# Patient Record
(demographics unavailable — no encounter records)

---

## 2024-11-01 NOTE — PHYSICAL EXAM
[Alert] : alert [Normal Voice/Communication] : normal voice/communication [Healthy Appearing] : healthy appearing [No Acute Distress] : no acute distress [Sclera] : the sclera and conjunctiva were normal [Hearing Threshold Finger Rub Not [place]] : hearing was normal [Normal Appearance] : the appearance of the neck was normal [No Respiratory Distress] : no respiratory distress [Respiration, Rhythm And Depth] : normal respiratory rhythm and effort [Auscultation Breath Sounds / Voice Sounds] : lungs were clear to auscultation bilaterally [Heart Rate And Rhythm] : heart rate was normal and rhythm regular [Normal S1, S2] : normal S1 and S2 [Bowel Sounds] : normal bowel sounds [Abdomen Tenderness] : non-tender [No Masses] : no abdominal mass palpated [Abdomen Soft] : soft [Oriented To Time, Place, And Person] : oriented to person, place, and time

## 2024-11-01 NOTE — PHYSICAL EXAM
[Alert] : alert [Normal Voice/Communication] : normal voice/communication [Healthy Appearing] : healthy appearing [No Acute Distress] : no acute distress [Sclera] : the sclera and conjunctiva were normal [Hearing Threshold Finger Rub Not [place]] : hearing was normal [Normal Appearance] : the appearance of the neck was normal [No Respiratory Distress] : no respiratory distress [Respiration, Rhythm And Depth] : normal respiratory rhythm and effort [Auscultation Breath Sounds / Voice Sounds] : lungs were clear to auscultation bilaterally [Heart Rate And Rhythm] : heart rate was normal and rhythm regular [Normal S1, S2] : normal S1 and S2 [Bowel Sounds] : normal bowel sounds [Abdomen Tenderness] : non-tender [No Masses] : no abdominal mass palpated [Abdomen Soft] : soft [Oriented To Time, Place, And Person] : oriented to person, place, and time

## 2024-11-04 NOTE — ASSESSMENT
[FreeTextEntry1] : #Diverticulosis -denies any constipation or BRBPR  -encouraged high fiber diet    #GERD -patient notices symptoms few hours after eating and when lying flat  -denies any dysphagia or odynophagia or other alarm symptoms  -will prescribe pantoprazole 40 QD    #H.pylori: s/p quadruple therapy, no eradication result  obtained yet, will reorder h. pylori stool AG   #screening for colon cancer: uptodate, repeat in 5 years  RTC in 3 months

## 2024-11-04 NOTE — HISTORY OF PRESENT ILLNESS
[FreeTextEntry1] : 51 females with PMH of HTN and extensive surgical history (4 C sections, hernia repair, cholecystectomy, appendectomy) here for follow up on colonoscopy results for CRC screening. Colonoscopy 10/24 revealed left sided moderate diverticulosis, but no polyps were noted. Patient does endorse reflux with eating and when lying flat. Takes pepcip prn for symptoms. Denies dysphagia, odynophagia, weight loss, hematemesis, melena, hematochezia, nausea, vomiting, or changes in bowel habits

## 2024-11-18 NOTE — HISTORY OF PRESENT ILLNESS
[FreeTextEntry1] : fu [de-identified] : 52 yo female w/ PMHx of HTN, asthma, obesity, TAMERA, and migraine presenting for follow up. Patient reports that her asthma is well controlled and uses inhaler 0-1 time per week. She also has 3-4 episodes of migraines a month and is on Rizatriptan PRN which the patient says helps and wishes to stay on.  Patient also reports she was out of losartan and needed a refill. she says that previously her BP at home was well controlled but did not bring her log with her.

## 2024-11-18 NOTE — PHYSICAL EXAM
[No Acute Distress] : no acute distress [Well Nourished] : well nourished [No JVD] : no jugular venous distention [No Respiratory Distress] : no respiratory distress  [No Accessory Muscle Use] : no accessory muscle use [Clear to Auscultation] : lungs were clear to auscultation bilaterally [Normal Rate] : normal rate  [Regular Rhythm] : with a regular rhythm [Normal S1, S2] : normal S1 and S2 [No Murmur] : no murmur heard [No Abdominal Bruit] : a ~M bruit was not heard ~T in the abdomen [No Edema] : there was no peripheral edema [No Extremity Clubbing/Cyanosis] : no extremity clubbing/cyanosis [Soft] : abdomen soft [Non Tender] : non-tender [Non-distended] : non-distended [No Masses] : no abdominal mass palpated [No HSM] : no HSM [No Focal Deficits] : no focal deficits [de-identified] : obesity [de-identified] : morbid obesity

## 2024-11-18 NOTE — ASSESSMENT
[FreeTextEntry1] : #Migraine controlled -On Rizatriptan PRN -Patient reports having 3-4 episodes a month lasting more than 24h controlled with treatment -No red change in frequency or quality or focal neurological symptoms -c/w triptan PRN  #HTN -c/w Losartan 50 mg OD DASH diet discussed and recommended Exercise and weight loss counseled Frequency and target at home BP readings discussed Decrease caffeine intake Treatment options and possible side effects discussed Patient counseled on symptoms of hypo/hypertension Counseled: Yearly Ophthalmology exams bp logs x 2 weeks  #Obesity - gaining weight  - worsening HTN, and new DM - counselled to lose weight to help with her BP and DM Obesity; Diet/Exercise Counseled Patient was counseled on changing their diet to low fat, low carbohydrates and low cholesterol. Patient was also counseled on increasing their activity to 45 minutes of exercise daily.  #new DM - a1c 6.5 this visit - mostly due to weight gain  Diabetes; Low sugar, low carbohydrate diet  Exercise Counseled  Patient given opportunity to discuss frequency and target blood sugar levels Patient educated on symptoms of hypo/hyperglycemic events  Counseled on: Yearly Ophthalmology and Podiatry Exam -   #Iron deficiency anemia -will order CBC and iron studies - repeat colonoscopy in october (fair prep) Colon Cancer Screening; Patient counseled on the importance of colonoscopy screening and directed to follow-up with GI doctor. Failure to perform test can result in Colon Cancer and Death.  #Asthma=-  - mild intermittent asthma well controlled with albuterol only. Using less than ounce a week stable  #Health maintenance Last mamogram in December 2023. Bi RADS 4. Biopsy was done in Jan 2024 and was benign PASH, routine screening needs to resume next Jan 2025 Mammogram Cancer Screening; Patient counseled on the importance of a yearly mammogram screening and directed to have test performed or follow-up with OB/GYN. Failure to perform test can result in breast cancer and death  Pap test 06/06/23: negative for malignancy; atrophic vaginitis HPV 06/06/23: not detected colonoscopy october 2024: Moderate diverticulosis of the the left side of the colon. External hemorrhoids. Normal mucosa in the whole colon Normal mucosa in the terminal ileum. Plan - Recommend high fiber diet - Counseled about the importance of hydration and bowel regimen if needed to avoid constipation in the setting of hemorrhoids and diverticulosis - Recommend outpatient follow up to check for Stool Helicobacter Pylori antigen test to confirm eradication of Helicobacter Pylori infection - In the setting of history of polyps, recommend repeat colonoscopy in 5 years  -F/U after 6 months -CBC, CMP, Lipid panel, A1c, Ferrtin, iron panel, hep B, Hep C, TSH, A/Creatin ratio

## 2024-11-18 NOTE — END OF VISIT
[] : Resident [FreeTextEntry3] : I saw the patient, examined the patient independently, reviewed medical record, and provided the medical services. Agree with resident note as personally edited above. new onset dm2 education as above rendered f/u after 3 mo of intensive lifestyle modifications c repeat a1c

## 2024-11-18 NOTE — REVIEW OF SYSTEMS
[Fever] : no fever [Chills] : no chills [Fatigue] : no fatigue [Chest Pain] : no chest pain [Palpitations] : no palpitations [Lower Ext Edema] : no lower extremity edema [Orthopnea] : no orthopnea [Shortness Of Breath] : no shortness of breath [Wheezing] : no wheezing [Cough] : no cough [Abdominal Pain] : no abdominal pain [Nausea] : no nausea [Constipation] : no constipation [Diarrhea] : diarrhea [Vomiting] : no vomiting [Dysuria] : no dysuria [Incontinence] : no incontinence [Hematuria] : no hematuria [Frequency] : no frequency [Dizziness] : no dizziness [de-identified] : headaches 3-4 times a month

## 2025-05-02 NOTE — ASSESSMENT
[FreeTextEntry1] : Ms. ELEANOR PATEL is a 51 year  old woman. She presented to the office for the first time on 05/02/2025 , her primary is DEEJAY GUALLPA .  [id number] 4 c section  2010 - hernia sx - lap  - doesnt remeber mesh  2012- appendix - dr beltran 2017 - gb - dr chand 2019: open hernia surgery - with ipm ventralight st - dr mcintosh recurrent epigastric hernia      impression recurret hernia morbid obsesty

## 2025-05-02 NOTE — HISTORY OF PRESENT ILLNESS
[de-identified] : Ms. ELEANOR PATEL is a 51 year  old woman. She presented to the office for the first time on 05/02/2025 , her primary is DEEJAY GUALLPA .  [id number] 4 c section  2010 - hernia sx - lap  - doesnt remeber mesh  2012- appendix - dr beltran 2017 - gb - dr chand 2019: open hernia surgery - with ipm ventralight st - dr mcintosh recurrent epigastric hernia

## 2025-06-27 NOTE — ASSESSMENT
[FreeTextEntry1] : Ms. ELEANOR PATEL is a 51 year  old woman. She presented to the office for the first time on 05/02/2025 , her primary is DEEJAY GUALLPA .  [id number] 4 c section  2010 - hernia sx - lap  - doesnt remeber mesh  2012- appendix - dr beltran 2017 - gb - dr chand 2019: open hernia surgery - with ipom ventralight st - dr mcintosh recurrent epigastric hernia  6/27 : ct dpme - small epigastric pain  is doign s alot of exercise and dieting    impression recurret hernia morbid obsesty  - bi 36.3  will need optimizatin  will get ct - fat contianing hernia will order a1c 6.5 will refer to surigical weightloss  We explained in great detail the pathophysiology of the disease process. We discussed the workup for diagnosis and management. The Post operative care was explained to the patient. She was counselled on diet , exercise and wound care. The Procedure was explained to the patient. The Risk , benefit and alternatives were discussed. We discussed recovery and possible complications. We discussed recurrence rate and complications including chronic abdominal pain. We also discussed hernia, surgery and  pain in relation to her  Job.   We explained in great detail the pathophysiology of the disease process. We discussed the workup for diagnosis and management. The Post operative care was explained to the patient. She was counselled on diet , exercise and wound care. The Procedure was explained to the patient. The Risk , benefit and alternatives were discussed. We discussed recovery and possible complications. We discussed recurrence rate and complications including chronic abdominal pain. We also discussed hernia, surgery and  pain in relation to her  Job. We discussed the intricacies of mesh insertion for hernia.  We discussed between kind of mesh synthetic vs biological, absorbability vs non absorbable meshes.We discussed about the position of mesh. We discussed about the fixation of the mesh. We discussed the complication of the mesh including erosions, infections, adhesions, recurrence, breaking , movement and chronic pain.  Counselling: The issue of increased BMI and weight was explained to the patient. We discussed how reducing weight before surgery can improve the outcomes of surgery. The surgery is more precise, less blood loss, complication and duration. The recovery is also easier, with reduced wound complications including infection.  She was counselled on diet and exercise.  We discussed the pathology and surgery with her.   We discussed for over 45  min, including her present medical conditions, medications and if they need to be changed, the medications she is on and various surgical and non-surgical options. This time excludes any separably reportable services (and /or teaching). The Risk, benefit and alternatives were discussed. We discussed recovery and possible complications. her radiological images and labs were independently reviewed in the PACS by me. The Images were reviewed with her.   We discussed the importance of close follow up. We informed that she needs to follow up in 2 months We also informed that she can call us if anything changes or has any questions.     Priscilla Ervin MD Minimally Invasive Surgery Foregut and [facility]  [facility]. [address], [address], [place] [address] Phone: [phone] Fax: [phone]

## 2025-06-27 NOTE — HISTORY OF PRESENT ILLNESS
[de-identified] : Ms. ELEANOR PATEL is a 51 year  old woman. She presented to the office for the first time on 05/02/2025 , her primary is DEEJAY GUALLPA .  [id number] 4 c section  2010 - hernia sx - lap  - doesnt remeber mesh  2012- appendix - dr beltran 2017 - gb - dr chand 2019: open hernia surgery - with ipom ventralight st - dr mcintosh recurrent epigastric hernia  6/27 : ct dpme - small epigastric pain  is doign s alot of exercise and dieting

## 2025-06-27 NOTE — ASSESSMENT
[FreeTextEntry1] : Ms. ELEANOR PATEL is a 51 year  old woman. She presented to the office for the first time on 05/02/2025 , her primary is DEEJAY GUALLPA .  [id number] 4 c section  2010 - hernia sx - lap  - doesnt remeber mesh  2012- appendix - dr beltran 2017 - gb - dr chand 2019: open hernia surgery - with ipom ventralight st - dr mcintosh recurrent epigastric hernia  6/27 : ct dpme - small epigastric pain  is doign s alot of exercise and dieting    impression recurret hernia morbid obsesty  - bi 36.3  will need optimizatin  will get ct - fat contianing hernia will order a1c 6.5 will refer to surigical weightloss  We explained in great detail the pathophysiology of the disease process. We discussed the workup for diagnosis and management. The Post operative care was explained to the patient. She was counselled on diet , exercise and wound care. The Procedure was explained to the patient. The Risk , benefit and alternatives were discussed. We discussed recovery and possible complications. We discussed recurrence rate and complications including chronic abdominal pain. We also discussed hernia, surgery and  pain in relation to her  Job.   We explained in great detail the pathophysiology of the disease process. We discussed the workup for diagnosis and management. The Post operative care was explained to the patient. She was counselled on diet , exercise and wound care. The Procedure was explained to the patient. The Risk , benefit and alternatives were discussed. We discussed recovery and possible complications. We discussed recurrence rate and complications including chronic abdominal pain. We also discussed hernia, surgery and  pain in relation to her  Job. We discussed the intricacies of mesh insertion for hernia.  We discussed between kind of mesh synthetic vs biological, absorbability vs non absorbable meshes.We discussed about the position of mesh. We discussed about the fixation of the mesh. We discussed the complication of the mesh including erosions, infections, adhesions, recurrence, breaking , movement and chronic pain.  Counselling: The issue of increased BMI and weight was explained to the patient. We discussed how reducing weight before surgery can improve the outcomes of surgery. The surgery is more precise, less blood loss, complication and duration. The recovery is also easier, with reduced wound complications including infection.  She was counselled on diet and exercise.  We discussed the pathology and surgery with her.   We discussed for over 45  min, including her present medical conditions, medications and if they need to be changed, the medications she is on and various surgical and non-surgical options. This time excludes any separably reportable services (and /or teaching). The Risk, benefit and alternatives were discussed. We discussed recovery and possible complications. her radiological images and labs were independently reviewed in the PACS by me. The Images were reviewed with her.   We discussed the importance of close follow up. We informed that she needs to follow up in 2 months We also informed that she can call us if anything changes or has any questions.     Priscilla Ervin MD Minimally Invasive Surgery Foregut and [facility]  [facility]. [address], [address], [place] [address] Phone: [phone] Fax: [phone]

## 2025-06-27 NOTE — HISTORY OF PRESENT ILLNESS
[de-identified] : Ms. ELEANOR PATEL is a 51 year  old woman. She presented to the office for the first time on 05/02/2025 , her primary is DEEJAY GUALLPA .  [id number] 4 c section  2010 - hernia sx - lap  - doesnt remeber mesh  2012- appendix - dr beltran 2017 - gb - dr chand 2019: open hernia surgery - with ipom ventralight st - dr mcintosh recurrent epigastric hernia  6/27 : ct dpme - small epigastric pain  is doign s alot of exercise and dieting

## 2025-06-27 NOTE — PHYSICAL EXAM
[JVD] : no jugular venous distention  [Purpura] : no purpura  [Alert] : alert [Calm] : calm [de-identified] : normal  [de-identified] : normal  [de-identified] : normal

## 2025-06-27 NOTE — PHYSICAL EXAM
[JVD] : no jugular venous distention  [Purpura] : no purpura  [Alert] : alert [Calm] : calm [de-identified] : normal  [de-identified] : normal  [de-identified] : normal